# Patient Record
Sex: MALE | Race: BLACK OR AFRICAN AMERICAN | NOT HISPANIC OR LATINO | Employment: FULL TIME | URBAN - METROPOLITAN AREA
[De-identification: names, ages, dates, MRNs, and addresses within clinical notes are randomized per-mention and may not be internally consistent; named-entity substitution may affect disease eponyms.]

---

## 2019-02-24 ENCOUNTER — HOSPITAL ENCOUNTER (EMERGENCY)
Facility: HOSPITAL | Age: 44
Discharge: HOME | End: 2019-02-24
Attending: INTERNAL MEDICINE
Payer: COMMERCIAL

## 2019-02-24 VITALS
WEIGHT: 165 LBS | DIASTOLIC BLOOD PRESSURE: 75 MMHG | TEMPERATURE: 98 F | BODY MASS INDEX: 25.9 KG/M2 | HEIGHT: 67 IN | OXYGEN SATURATION: 99 % | RESPIRATION RATE: 18 BRPM | HEART RATE: 74 BPM | SYSTOLIC BLOOD PRESSURE: 122 MMHG

## 2019-02-24 DIAGNOSIS — R09.1 PLEURISY: Primary | ICD-10-CM

## 2019-02-24 LAB
ALBUMIN SERPL-MCNC: 3.7 G/DL (ref 3.3–5.5)
ALP SERPL-CCNC: 75 U/L (ref 42–141)
BILIRUB SERPL-MCNC: 1 MG/DL (ref 0.2–1.6)
BUN SERPL-MCNC: 15 MG/DL (ref 7–22)
CALCIUM SERPL-MCNC: 9.1 MG/DL (ref 8–10.3)
CHLORIDE SERPL-SCNC: 103 MMOL/L (ref 98–108)
CREAT SERPL-MCNC: 1.3 MG/DL (ref 0.6–1.2)
GLUCOSE SERPL-MCNC: 95 MG/DL (ref 73–118)
POC ALT (SGPT): 33 U/L (ref 10–47)
POC AST (SGOT): 38 U/L (ref 11–38)
POC CARDIAC TROPONIN I: 0 NG/ML
POC TCO2: 32 MMOL/L (ref 18–33)
POTASSIUM BLD-SCNC: 3.9 MMOL/L (ref 3.6–5.1)
PROTEIN, POC: 7.2 G/DL (ref 6.4–8.1)
SAMPLE: NORMAL
SODIUM BLD-SCNC: 146 MMOL/L (ref 128–145)

## 2019-02-24 PROCEDURE — 63600175 PHARM REV CODE 636 W HCPCS: Mod: ER | Performed by: INTERNAL MEDICINE

## 2019-02-24 PROCEDURE — 25000003 PHARM REV CODE 250: Mod: ER | Performed by: INTERNAL MEDICINE

## 2019-02-24 PROCEDURE — 85025 COMPLETE CBC W/AUTO DIFF WBC: CPT | Mod: ER

## 2019-02-24 PROCEDURE — 80053 COMPREHEN METABOLIC PANEL: CPT | Mod: ER

## 2019-02-24 PROCEDURE — 99284 EMERGENCY DEPT VISIT MOD MDM: CPT | Mod: ER

## 2019-02-24 PROCEDURE — 84484 ASSAY OF TROPONIN QUANT: CPT | Mod: ER

## 2019-02-24 RX ORDER — PREDNISONE 20 MG/1
40 TABLET ORAL DAILY
Qty: 10 TABLET | Refills: 0 | Status: SHIPPED | OUTPATIENT
Start: 2019-02-24 | End: 2019-03-01

## 2019-02-24 RX ORDER — PREDNISONE 20 MG/1
60 TABLET ORAL
Status: COMPLETED | OUTPATIENT
Start: 2019-02-24 | End: 2019-02-24

## 2019-02-24 RX ORDER — ASPIRIN 325 MG
325 TABLET ORAL
Status: COMPLETED | OUTPATIENT
Start: 2019-02-24 | End: 2019-02-24

## 2019-02-24 RX ADMIN — PREDNISONE 60 MG: 20 TABLET ORAL at 09:02

## 2019-02-24 RX ADMIN — ASPIRIN 325 MG ORAL TABLET 325 MG: 325 PILL ORAL at 08:02

## 2019-02-25 NOTE — ED NOTES
Patient lying in the bed with spouse at his side, patient still c/o of chest pain rated a 10 on a scale of 10/10 patient v/s taken and patient is on cardiac monitoring

## 2019-02-25 NOTE — ED PROVIDER NOTES
"Encounter Date: 2/24/2019    SCRIBE #1 NOTE: I, Shlomo Camacho, am scribing for, and in the presence of,  Dr. Stoll. I have scribed the following portions of the note - the EKG reading. Other sections scribed: HPI, ROS, PE.       History     Chief Complaint   Patient presents with    Chest wall pain     Pt reports sharp, intermittent, left chest wall pain x 3 days that increases with certain movements and deep breathing. Denies N/V, diaphoresis. Reports some coughing. No acute distress noted in triage.      This is a 43 y.o. male who presents to the ED with a complaint of intermittent left sided CP that began three days ago.  He describes his pain as a "sharp" sensation.  Taking a deep breath worsens his pain.  Nothing provides relief.  He has taken one dose of 1200 mg Ibuprofen one time without relief.  He denies fever, chills, diaphoresis, nausea, or emesis.  Pt endorses mild coughing. He denies a FHx of MI, but notes that Lupus runs on his mother's side of the family.  He denies tobacco use.        The history is provided by the patient.     Review of patient's allergies indicates:  No Known Allergies  Past Medical History:   Diagnosis Date    Cervical spine fracture      History reviewed. No pertinent surgical history.  History reviewed. No pertinent family history.  Social History     Tobacco Use    Smoking status: Never Smoker    Smokeless tobacco: Never Used   Substance Use Topics    Alcohol use: No    Drug use: No     Review of Systems   Constitutional: Negative for chills, diaphoresis and fever.   Respiratory: Positive for cough.    Cardiovascular: Positive for chest pain.   Gastrointestinal: Negative for nausea and vomiting.   All other systems reviewed and are negative.      Physical Exam     Initial Vitals [02/24/19 1934]   BP Pulse Resp Temp SpO2   111/77 67 16 98.4 °F (36.9 °C) 100 %      MAP       --         Physical Exam    Nursing note and vitals reviewed.  Constitutional: He appears " well-developed and well-nourished.   HENT:   Head: Normocephalic and atraumatic.   Eyes: Conjunctivae are normal.   Neck: Normal range of motion. Neck supple.   Cardiovascular: Normal rate and intact distal pulses.   Pulmonary/Chest: No respiratory distress.   Musculoskeletal: Normal range of motion.   Neurological: He is alert and oriented to person, place, and time.   Skin: Skin is warm and dry.   Psychiatric: He has a normal mood and affect. His behavior is normal.         ED Course   Procedures  Labs Reviewed   POCT CMP - Abnormal; Notable for the following components:       Result Value    AST (SGOT), POC 38 (*)     POC Creatinine 1.3 (*)     POC Sodium 146 (*)     All other components within normal limits   TROPONIN ISTAT   POCT CBC   POCT CMP   POCT TROPONIN         EKG Readings: (Independently Interpreted)   Rhythm: Normal Sinus Rhythm. Heart Rate: 69. Other Impression: LVH       Imaging Results          X-Ray Chest PA And Lateral (Final result)  Result time 02/24/19 20:54:37    Final result by Domenico Hamilton MD (02/24/19 20:54:37)                 Impression:      1. No acute cardiopulmonary process.      Electronically signed by: Domenico Hamilton MD  Date:    02/24/2019  Time:    20:54             Narrative:    EXAMINATION:  XR CHEST PA AND LATERAL    CLINICAL HISTORY:  Chest Pain;    TECHNIQUE:  PA and lateral views of the chest were performed.    COMPARISON:  CT 08/13/2014    FINDINGS:  The cardiomediastinal silhouette is not enlarged..  There is no pleural effusion.  The trachea is midline.  The lungs are symmetrically expanded bilaterally without evidence of acute parenchymal process. No large focal consolidation seen.  There is no pneumothorax.  The osseous structures are unremarkable.                                 Medical Decision Making:   Initial Assessment:   This is a 43 y.o. male who presents to the ED with a complaint of intermittent left sided CP that began three days ago.  He describes  "his pain as a "sharp" sensation.  Taking a deep breath worsens his pain.  Nothing provides relief.  He has taken one dose of 1200 mg Ibuprofen one time without relief.  He denies fever, chills, diaphoresis, nausea, or emesis.  Pt endorses mild coughing. He denies a FHx of MI, but notes that Lupus runs on his mother's side of the family.  He denies tobacco use.  Clinical Tests:   Lab Tests: Ordered  Radiological Study: Ordered  Medical Tests: Ordered  ED Management:  CBC, CMP and chest x-ray were within normal limits except for slightly elevated creatinine, sodium in AST..  Troponin was within normal limits.  Patient was given instructions for pleurisy and received prednisone.  He was advised to follow up with primary care physician within the next 2 days for re-evaluation and to return to the emergency department if condition worsens.            Scribe Attestation:   Scribe #1: I performed the above scribed service and the documentation accurately describes the services I performed. I attest to the accuracy of the note.    This document was produced by a scribe under my direction and in my presence. I agree with the content of the note and have made any necessary edits.     Dr. Stoll    02/24/2019 8:59 PM             Clinical Impression:     1. Pleurisy           Disposition:   Disposition: Discharged  Condition: Stable                        Sky Stoll MD  02/25/19 0621    "

## 2020-01-03 ENCOUNTER — HOSPITAL ENCOUNTER (EMERGENCY)
Facility: HOSPITAL | Age: 45
Discharge: HOME | End: 2020-01-03
Attending: EMERGENCY MEDICINE
Payer: COMMERCIAL

## 2020-01-03 VITALS
OXYGEN SATURATION: 97 % | RESPIRATION RATE: 18 BRPM | WEIGHT: 160 LBS | HEART RATE: 72 BPM | DIASTOLIC BLOOD PRESSURE: 98 MMHG | HEIGHT: 68 IN | BODY MASS INDEX: 24.25 KG/M2 | SYSTOLIC BLOOD PRESSURE: 143 MMHG

## 2020-01-03 DIAGNOSIS — S39.012A LUMBAR STRAIN, INITIAL ENCOUNTER: Primary | ICD-10-CM

## 2020-01-03 PROCEDURE — 99283 EMERGENCY DEPT VISIT LOW MDM: CPT | Mod: ER

## 2020-01-03 RX ORDER — METHOCARBAMOL 500 MG/1
500 TABLET, FILM COATED ORAL 2 TIMES DAILY PRN
Qty: 15 TABLET | Refills: 0 | Status: SHIPPED | OUTPATIENT
Start: 2020-01-03 | End: 2020-01-08

## 2020-01-04 NOTE — ED PROVIDER NOTES
Encounter Date: 1/3/2020    SCRIBE #1 NOTE: I, Dana Velazquez, am scribing for, and in the presence of,  Dr. Chaka Lozada. I have scribed the following portions of the note - Other sections scribed: HPI, ROS, PE.       History     Chief Complaint   Patient presents with    Back Pain     pt c/o back pain x 5 day s/p slip and fall at work , no relief with OTC meds      Tiffanie Patricio is a 44 y.o. male who presents to the ED complaining of worsening back pain x 5 days ago. Denies fever, CP, SOB, dizziness, HA, numbness and weakness. Denies LOC. Has NKDA.     The history is provided by the patient. No  was used.     Review of patient's allergies indicates:  No Known Allergies  Past Medical History:   Diagnosis Date    Cervical spine fracture      History reviewed. No pertinent surgical history.  No family history on file.  Social History     Tobacco Use    Smoking status: Never Smoker    Smokeless tobacco: Never Used   Substance Use Topics    Alcohol use: No    Drug use: No     Review of Systems   Constitutional: Negative.  Negative for fever.   HENT: Negative.  Negative for sore throat.    Eyes: Negative.  Negative for pain.   Respiratory: Negative.  Negative for shortness of breath.    Cardiovascular: Negative.  Negative for chest pain.   Gastrointestinal: Negative.  Negative for abdominal pain and vomiting.   Genitourinary: Negative.  Negative for dysuria.   Musculoskeletal: Positive for back pain.   Skin: Negative.  Negative for rash.   Neurological: Negative.  Negative for dizziness, weakness, numbness and headaches.   Hematological: Negative.    Psychiatric/Behavioral: Negative.    All other systems reviewed and are negative.      Physical Exam     Initial Vitals   BP Pulse Resp Temp SpO2   01/03/20 2145 01/03/20 2145 01/03/20 2145 -- 01/03/20 2330   (!) 150/107 69 18  97 %      MAP       --                Physical Exam    Nursing note and vitals reviewed.  Constitutional: He appears  well-developed and well-nourished.   HENT:   Head: Normocephalic and atraumatic.   Right Ear: External ear normal.   Left Ear: External ear normal.   Mouth/Throat: Oropharynx is clear and moist.   Eyes: Conjunctivae and EOM are normal.   Neck: Normal range of motion. Neck supple.   Cardiovascular: Normal rate, regular rhythm, normal heart sounds and intact distal pulses. Exam reveals no gallop and no friction rub.    No murmur heard.  Pulmonary/Chest: Effort normal and breath sounds normal. No respiratory distress. He has no wheezes. He has no rhonchi. He has no rales.   Abdominal: Soft. There is no tenderness.   Musculoskeletal: Normal range of motion.   + Paraspinal muscle spasms    Neurological: He is alert and oriented to person, place, and time.   Skin: Skin is warm and dry.   Psychiatric: He has a normal mood and affect. His behavior is normal.         ED Course   Procedures  Labs Reviewed - No data to display       Imaging Results    None          Medical Decision Making:   History:   Old Medical Records: I decided to obtain old medical records.            Scribe Attestation:   Scribe #1: I performed the above scribed service and the documentation accurately describes the services I performed. I attest to the accuracy of the note.               This document was produced by a scribe under my direction and in my presence. I agree with the content of the note and have made any necessary edits.     Chaka Lozada MD    01/04/2020 12:37 AM           Clinical Impression:     1. Lumbar strain, initial encounter                                Chaka Lozada MD  01/04/20 0037

## 2020-03-24 ENCOUNTER — HOSPITAL ENCOUNTER (EMERGENCY)
Facility: HOSPITAL | Age: 45
Discharge: HOME | End: 2020-03-24
Attending: EMERGENCY MEDICINE
Payer: COMMERCIAL

## 2020-03-24 VITALS
SYSTOLIC BLOOD PRESSURE: 116 MMHG | HEART RATE: 91 BPM | OXYGEN SATURATION: 98 % | RESPIRATION RATE: 20 BRPM | WEIGHT: 150 LBS | DIASTOLIC BLOOD PRESSURE: 69 MMHG | BODY MASS INDEX: 22.81 KG/M2 | TEMPERATURE: 100 F

## 2020-03-24 DIAGNOSIS — B34.9 VIRAL SYNDROME: Primary | ICD-10-CM

## 2020-03-24 PROCEDURE — 99281 EMR DPT VST MAYX REQ PHY/QHP: CPT

## 2020-03-24 NOTE — ED PROVIDER NOTES
"Encounter Date: 3/24/2020    SCRIBE #1 NOTE: I, Luisa Yosef, am scribing for, and in the presence of,  Antonio Liu MD. I have scribed the following portions of the note - Other sections scribed: HPI, ROS, PE, MDM.       History     Chief Complaint   Patient presents with    General Illness     c/o cough and fever x "couple days". pt reports fevers at home. pt denies SOB.      CC: Fever    HPI: This 44 y.o male, with a medical history of cervical spine fracture, presents to the ED c/o an acute, intermittent fever since Friday (3/20/20). Pt reports also experiencing body aches, chest pain (when taking a deep breath), nasal congestion as well as head congestion. Pt denies heart or lung issues. No other associated symptoms. No alleviating factors.    The history is provided by the patient.     Review of patient's allergies indicates:  No Known Allergies  Past Medical History:   Diagnosis Date    Cervical spine fracture      No past surgical history on file.  No family history on file.  Social History     Tobacco Use    Smoking status: Never Smoker    Smokeless tobacco: Never Used   Substance Use Topics    Alcohol use: No    Drug use: No     Review of Systems   Constitutional: Positive for fever. Negative for chills.   HENT: Positive for congestion (nasal; head). Negative for postnasal drip, rhinorrhea, sinus pressure and sore throat.    Eyes: Negative for pain, redness and visual disturbance.   Respiratory: Negative for apnea, cough, choking, shortness of breath, wheezing and stridor.    Cardiovascular: Positive for chest pain (when taking a deep breath). Negative for palpitations and leg swelling.   Gastrointestinal: Negative for abdominal distention, abdominal pain, blood in stool, constipation, diarrhea, nausea and vomiting.   Endocrine: Negative for cold intolerance and heat intolerance.   Genitourinary: Negative for dysuria, flank pain, hematuria and urgency.   Musculoskeletal: Positive for " myalgias. Negative for arthralgias and back pain.   Skin: Negative for rash and wound.   Allergic/Immunologic: Negative for immunocompromised state.   Neurological: Negative for dizziness, weakness, light-headedness and headaches.   Hematological: Does not bruise/bleed easily.   Psychiatric/Behavioral: Negative for agitation, behavioral problems, confusion and hallucinations. The patient is not nervous/anxious.        Physical Exam     Initial Vitals [03/24/20 1001]   BP Pulse Resp Temp SpO2   116/69 91 19 99.2 °F (37.3 °C) 98 %      MAP       --         Physical Exam    Nursing note and vitals reviewed.  Constitutional: He appears well-developed and well-nourished. He is not diaphoretic. No distress.   HENT:   Head: Normocephalic and atraumatic.   Right Ear: External ear normal.   Left Ear: External ear normal.   Mouth/Throat: Oropharynx is clear and moist.   Eyes: Pupils are equal, round, and reactive to light. Right eye exhibits no discharge. Left eye exhibits no discharge. No scleral icterus.   Neck: Normal range of motion. Neck supple. No thyromegaly present. No tracheal deviation present. No JVD present.   Cardiovascular: Normal rate, regular rhythm, normal heart sounds and intact distal pulses. Exam reveals no gallop and no friction rub.    No murmur heard.  Pulmonary/Chest: Breath sounds normal. No stridor. No respiratory distress. He has no wheezes. He has no rhonchi. He has no rales. He exhibits no tenderness.   Abdominal: Soft. He exhibits no distension and no mass. There is no tenderness. There is no rebound and no guarding.   Musculoskeletal: Normal range of motion. He exhibits no edema or tenderness.   Lymphadenopathy:     He has no cervical adenopathy.   Neurological: He is alert and oriented to person, place, and time. He has normal strength. GCS score is 15. GCS eye subscore is 4. GCS verbal subscore is 5. GCS motor subscore is 6.   SAMUEL with NGND's   Skin: Skin is warm and dry. Capillary refill  takes less than 2 seconds. No rash noted. No erythema.   Psychiatric: He has a normal mood and affect. His behavior is normal. Judgment and thought content normal.         ED Course   Procedures  Labs Reviewed - No data to display       Imaging Results    None          Medical Decision Making:   Initial Assessment:   This is a 44 year old male who presents to the ED complaining of a fever. The patient was seen and examined. The history and physical exam was obtained. The nursing notes and vital signs were reviewed.   The patient was discharged in stable condition. Patient was advised to visit a COVID testing center to be tested for COVID 19. Explicit return instructions were provided and the patient verbalized understanding of and agreement with the disposition. You may return for worsening symptoms.              Scribe Attestation:   Scribe #1: I performed the above scribed service and the documentation accurately describes the services I performed. I attest to the accuracy of the note.      Pt arrived alert, afebrile, non-toxic in appearance, in no acute respiratory distress with VSS.  EKG revealed no acute findings concerning for ischemia, arrhythmia, heart block or any pathology to warrant cardiology consultation.  Sx's and presentation correlate with viral syndrome.  Pt had no respiratory distress or hypoxia at rest or with ambulation.  Pt discharged and counseled on the need to return to the nearest emergency room if they experience any other concerning symptoms.  Pt counseled to F/U outpatient with a PCP over the next two to three days.    Antonio Liu MD                            Clinical Impression:       ICD-10-CM ICD-9-CM   1. Viral syndrome B34.9 079.99             ED Disposition Condition    Discharge Stable        ED Prescriptions     None        Follow-up Information     Follow up With Specialties Details Why Contact Info    [facility]  Schedule an appointment as soon as  possible for a visit in 3 days or with your primary care physician to follow-up on today's visit [address] [place] [address]  [phone]      [facility] Emergency [facility] Go to  If symptoms worsen [address] [place] [address] [phone]                       I, Antonio Liu, personally performed the services described in this documentation. All medical record entries made by the scribe were at my direction and in my presence.  I have reviewed the chart and agree that the record reflects my personal performance and is accurate and complete.               Antonio Liu MD  03/25/20 8511

## 2021-12-28 ENCOUNTER — OFFICE VISIT (OUTPATIENT)
Dept: URGENT CARE | Facility: CLINIC | Age: 46
End: 2021-12-28
Payer: COMMERCIAL

## 2024-12-09 ENCOUNTER — HOSPITAL ENCOUNTER (EMERGENCY)
Facility: HOSPITAL | Age: 49
Discharge: HOME | End: 2024-12-09
Attending: EMERGENCY MEDICINE

## 2024-12-09 VITALS
WEIGHT: 165 LBS | SYSTOLIC BLOOD PRESSURE: 106 MMHG | OXYGEN SATURATION: 95 % | DIASTOLIC BLOOD PRESSURE: 70 MMHG | HEART RATE: 91 BPM | HEIGHT: 68 IN | BODY MASS INDEX: 25.01 KG/M2 | RESPIRATION RATE: 20 BRPM | TEMPERATURE: 100 F

## 2024-12-09 DIAGNOSIS — J10.1 INFLUENZA A: Primary | ICD-10-CM

## 2024-12-09 LAB
HCV AB SERPL QL IA: NORMAL
HIV 1+2 AB+HIV1 P24 AG SERPL QL IA: NORMAL
INFLUENZA A, MOLECULAR: POSITIVE
INFLUENZA B, MOLECULAR: NEGATIVE
SARS-COV-2 RDRP RESP QL NAA+PROBE: NEGATIVE
SPECIMEN SOURCE: ABNORMAL

## 2024-12-09 PROCEDURE — 25000003 PHARM REV CODE 250: Performed by: EMERGENCY MEDICINE

## 2024-12-09 PROCEDURE — 87389 HIV-1 AG W/HIV-1&-2 AB AG IA: CPT | Performed by: PHYSICIAN ASSISTANT

## 2024-12-09 PROCEDURE — 99283 EMERGENCY DEPT VISIT LOW MDM: CPT

## 2024-12-09 PROCEDURE — 86803 HEPATITIS C AB TEST: CPT | Performed by: PHYSICIAN ASSISTANT

## 2024-12-09 PROCEDURE — 87635 SARS-COV-2 COVID-19 AMP PRB: CPT | Performed by: EMERGENCY MEDICINE

## 2024-12-09 PROCEDURE — 87502 INFLUENZA DNA AMP PROBE: CPT | Performed by: EMERGENCY MEDICINE

## 2024-12-09 RX ORDER — IBUPROFEN 400 MG/1
400 TABLET ORAL
Status: COMPLETED | OUTPATIENT
Start: 2024-12-09 | End: 2024-12-09

## 2024-12-09 RX ADMIN — IBUPROFEN 400 MG: 400 TABLET ORAL at 09:12

## 2024-12-09 NOTE — Clinical Note
"Tiffanie Zee" Sintia was seen and treated in our emergency department on 12/9/2024.  He may return to work on 12/12/2024.       If you have any questions or concerns, please don't hesitate to call.      TING Green RN    "

## 2024-12-09 NOTE — DISCHARGE INSTRUCTIONS
Take acetaminophen (Tylenol) or ibuprofen (Motrin, Advil) over-the-counter for fever and/or bodyaches as needed.  Please strictly follow all instructions on the packaging and do not take more medication per dose and per day than the instructions recommend.

## 2024-12-09 NOTE — ED TRIAGE NOTES
Fever, chills, cough, dizziness, body aches, and sore throat since Saturday. Denies sick contacts. Denies vomiting and diarrhea. No relief from tyelnol, mucinex, and nyquil.

## 2024-12-09 NOTE — ED PROVIDER NOTES
"Chief Complaint   Multiple complaints  (Fever, headache, chills, and fatigue x2 days )      History Of Present Illness   Tiffanie Patricio is a 49 y.o. male presenting with fever, chills, scratchy throat, body aches, malaise and fatigue for 2 days.  No shortness of breath.  No nausea or vomiting.  No sick contacts.  Associated sporadic dry cough.      Review of patient's allergies indicates:  No Known Allergies    No current facility-administered medications on file prior to encounter.     No current outpatient medications on file prior to encounter.       Past History   As per HPI and below:  Past Medical History:   Diagnosis Date    Cervical spine fracture      No past surgical history on file.    Social History     Tobacco Use    Smoking status: Never    Smokeless tobacco: Never   Substance Use Topics    Alcohol use: No    Drug use: No       No family history on file.    Physical Exam     Vitals:    12/09/24 0832 12/09/24 1000 12/09/24 1119   BP: (!) 116/58 106/70 106/70   Pulse: 109 99 91   Resp: 18 20 20   Temp: (!) 101.7 °F (38.7 °C) 98 °F (36.7 °C) 99.6 °F (37.6 °C)   TempSrc: Oral Oral Oral   SpO2: 96% 95% 95%   Weight: 74.8 kg (165 lb)     Height: 5' 8" (1.727 m)       Appearance: No acute distress.  Skin: No rashes seen.  Good turgor.  No abrasions.  No ecchymoses.  Eyes: No conjunctival injection.  ENT: Oropharynx with erythema, no swelling or exudates.  Nodes;   Chest: Clear to auscultation bilaterally.  Good air movement.  No wheezes.  No rhonchi.  Cardiovascular: Regular rate and rhythm.  No murmurs. No gallops. No rubs.  Abdomen: Soft.  Not distended.  Nontender.  No guarding.  No rebound.  Musculoskeletal: Good range of motion all joints.  No deformities.  Neck supple.  No meningismus.  Neurologic: Motor intact.  Sensation intact.  Cerebellar intact.  Cranial nerves intact.  Mental Status:  Alert and oriented x 3.  Appropriate, conversant.      Initial MDM   Fever, chills, cough, body aches.  Started 2 " days ago.  This is consistent with viral illness.  Will do flu and COVID swabs.  Associated sore throat, but also with cough.  No exudates.  Lymph nodes are not impressive.  Doubt strep.  Given symptomatology consistent with viral illness, highly doubt sepsis and do not feel septic workup is indicated.  Lungs are clear, cough is not terrible, I do not think chest x-ray is needed.      Medications Given     Medications   ibuprofen tablet 400 mg (400 mg Oral Given 12/9/24 0959)       Results and Course     Abnormal Labs Reviewed   INFLUENZA A & B BY MOLECULAR - Abnormal; Notable for the following components:       Result Value    Influenza A, Molecular Positive (*)     All other components within normal limits       Imaging Results    None         ED Course as of 12/09/24 1140   Mon Dec 09, 2024   1139 Influenza A, Molecular(!): Positive [DC]   1139 SARS-CoV-2 RNA, Amplification, Qual: Negative [DC]      ED Course User Index  [DC] Rcik Burger MD         Centor Score (Modified/McIsaac) for Strep Pharyngitis - MDCalc  Calculated on Dec 09 2024 9:34 AM  0 points -> 1% - 2.5% likelihood of strep No further testing nor antibiotics.        MDM, Impression and Plan   49 y.o. male with influenza a.  Given that he is 2 days from onset of symptoms, antivirals are unlikely to change course of illness, so will not prescribe.  Recommend conservative treatment with ibuprofen/acetaminophen, follow-up PCP p.r.n..         Final diagnoses:  [J10.1] Influenza A (Primary)        ED Disposition Condition    Discharge Stable          ED Prescriptions    None       Follow-up Information       Follow up With Specialties Details Why Contact Info    Your primary care doctor  In 1 week As needed                Rick Burger MD  12/09/24 9519